# Patient Record
Sex: MALE | Race: WHITE | NOT HISPANIC OR LATINO | Employment: OTHER | URBAN - NONMETROPOLITAN AREA
[De-identification: names, ages, dates, MRNs, and addresses within clinical notes are randomized per-mention and may not be internally consistent; named-entity substitution may affect disease eponyms.]

---

## 2020-10-12 ENCOUNTER — OFFICE VISIT (OUTPATIENT)
Dept: ORTHOPEDIC SURGERY | Facility: CLINIC | Age: 75
End: 2020-10-12

## 2020-10-12 VITALS — HEIGHT: 71 IN | WEIGHT: 250 LBS | TEMPERATURE: 97.8 F | BODY MASS INDEX: 35 KG/M2

## 2020-10-12 DIAGNOSIS — G30.0 DEMENTIA IN ALZHEIMER'S DISEASE WITH EARLY ONSET (HCC): ICD-10-CM

## 2020-10-12 DIAGNOSIS — M17.12 PRIMARY OSTEOARTHRITIS OF LEFT KNEE: Primary | ICD-10-CM

## 2020-10-12 DIAGNOSIS — F02.80 DEMENTIA IN ALZHEIMER'S DISEASE WITH EARLY ONSET (HCC): ICD-10-CM

## 2020-10-12 PROCEDURE — 99204 OFFICE O/P NEW MOD 45 MIN: CPT | Performed by: ORTHOPAEDIC SURGERY

## 2020-10-12 RX ORDER — ZOLPIDEM TARTRATE 10 MG/1
TABLET ORAL
COMMUNITY
Start: 2020-09-08

## 2020-10-12 RX ORDER — POTASSIUM CHLORIDE 1500 MG/1
TABLET, EXTENDED RELEASE ORAL
COMMUNITY
Start: 2020-09-17

## 2020-10-12 RX ORDER — DONEPEZIL HYDROCHLORIDE 10 MG/1
TABLET, FILM COATED ORAL
COMMUNITY
Start: 2020-07-23

## 2020-10-12 RX ORDER — PSEUDOEPHEDRINE HCL 30 MG/1
TABLET, FILM COATED ORAL
COMMUNITY
Start: 2020-10-02

## 2020-10-12 RX ORDER — EZETIMIBE 10 MG/1
TABLET ORAL
COMMUNITY
Start: 2020-07-24

## 2020-10-12 RX ORDER — TRAMADOL HYDROCHLORIDE 50 MG/1
50 TABLET ORAL 4 TIMES DAILY
COMMUNITY
Start: 2020-09-16

## 2020-10-12 RX ORDER — OMEPRAZOLE 40 MG/1
CAPSULE, DELAYED RELEASE ORAL
COMMUNITY
Start: 2020-09-17

## 2020-10-12 RX ORDER — AMLODIPINE BESYLATE AND BENAZEPRIL HYDROCHLORIDE 10; 40 MG/1; MG/1
CAPSULE ORAL
COMMUNITY
Start: 2020-08-12

## 2020-10-12 RX ORDER — PREGABALIN 200 MG/1
CAPSULE ORAL
COMMUNITY
Start: 2020-07-24

## 2020-10-12 RX ORDER — ASPIRIN 81 MG/1
81 TABLET ORAL DAILY
COMMUNITY

## 2020-10-12 RX ORDER — ATENOLOL 25 MG/1
TABLET ORAL
COMMUNITY
Start: 2020-09-08

## 2020-10-12 NOTE — PROGRESS NOTES
"NEW VISIT    Patient: Saji Potts  ?  YOB: 1945    MRN: [id number]  ?  Chief Complaint   Patient presents with   • Left Knee - Pain   • Establish Care      ?  HPI: NEW LEFT KNEE PAIN  Saji Lowe presents to the office with bilateral knee pain and discomfort.  He has worsening knee pain on the left side as compared to the right side.  The patient states that he has pain and discomfort for several years.  Over the past few months his symptoms have gotten progressively worse.  He is unable to fully extend his knee because of the arthritis.  He has been treated with a brace and anti-inflammatory medication.  He states \"my knees are essentially worn out \".  He uses a walker for assistance with ambulation because he is afraid of his knee buckling and giving out from underneath him.  He has a progressive varus deformity which is worse on the left knee.      Pain Location: LEFT knee  Radiation: none  Quality: aching, sharp  Intensity/Severity: mild-moderate  Duration: 4-5 years  Onset quality: gradual   Timing: intermittent  Aggravating Factors: going up and down stairs, kneeling, rising after sitting, squatting  Alleviating Factors: NSAIDs, rest, ice  Previous Episodes: no  Associated Symptoms: pain, swelling  ADLs Affected: ambulating, recreational activities/sports  Previous Treatment: uses walker and cane    This patient is a new patient.  This problem is new to this examiner.      Allergies: No Known Allergies    Medications:   Home Medications:  Current Outpatient Medications on File Prior to Visit   Medication Sig   • amLODIPine-benazepril (LOTREL) 10-40 MG per capsule    • aspirin 81 MG EC tablet Take 81 mg by mouth Daily.   • atenolol (TENORMIN) 25 MG tablet    • donepezil (ARICEPT) 10 MG tablet TAKE 1 TABLET BY MOUTH AT NIGHT FOR 30 DAYS   • ezetimibe (ZETIA) 10 MG tablet    • KLOR-CON 20 MEQ CR tablet    • omeprazole (priLOSEC) 40 MG capsule    • pregabalin (LYRICA) 200 MG capsule    • " "SudoGest 30 MG tablet TAKE 2 TABLETS BY MOUTH EVERY 6 HOURS   • traMADol (ULTRAM) 50 MG tablet Take 50 mg by mouth 4 (Four) Times a Day.   • zolpidem (AMBIEN) 10 MG tablet      No current facility-administered medications on file prior to visit.      Current Medications:  Scheduled Meds:  PRN Meds:.    I have reviewed the patient's medical history in detail and updated the computerized patient record.  Review and summarization of old records include:    Past Medical History:   Diagnosis Date   • Diabetes mellitus (CMS/HCC)    • Kidney disease      No past surgical history on file.  Social History     Occupational History   • Not on file   Tobacco Use   • Smoking status: Never Smoker   • Smokeless tobacco: Never Used   Substance and Sexual Activity   • Alcohol use: Defer   • Drug use: Defer   • Sexual activity: Defer      No family history on file.      Review of Systems   Constitutional: Negative.  Negative for fever.   HENT: Negative.    Eyes: Negative.    Respiratory: Negative.    Cardiovascular: Negative.    Endocrine: Negative.    Genitourinary: Negative.    Musculoskeletal: Positive for arthralgias, gait problem and joint swelling.   Skin: Negative.  Negative for rash and wound.   Allergic/Immunologic: Negative.    Neurological: Negative for numbness.   Hematological: Negative.    Psychiatric/Behavioral: Negative.           Wt Readings from Last 3 Encounters:   10/12/20 113 kg (250 lb)     Ht Readings from Last 3 Encounters:   10/12/20 180.3 cm (71\")     Body mass index is 34.87 kg/m².  Facility age limit for growth percentiles is 20 years.  Vitals:    10/12/20 1523   Temp: 97.8 °F (36.6 °C)         Physical Exam  Constitutional: Patient is oriented to person, place, and time. Appears well-developed and well-nourished.   HENT:   Head: Normocephalic and atraumatic.   Eyes: Conjunctivae and EOM are normal. Pupils are equal, round, and reactive to light.   Cardiovascular: Normal rate, regular rhythm, normal heart " sounds and intact distal pulses.   Pulmonary/Chest: Effort normal and breath sounds normal.   Musculoskeletal:   See detailed exam below   Neurological: Alert and oriented to person, place, and time. No sensory deficit. Coordination normal.   Skin: Skin is warm and dry. Capillary refill takes less than 2 seconds. No rash noted. No erythema.   Psychiatric: Patient has a normal mood and affect. His behavior is normal. Judgment and thought content normal.   Nursing note and vitals reviewed.      Ortho Exam:   Left knee (varus). Patient has crepitus throughout range of motion. Positive patellar grind test. Mild effusion. Lachman is negative. Pivot shift is negative. Anterior and posterior drawer signs are negative. Significant joint line tenderness is noted on the medial aspect of the knee. Patient has a varus orientation of the knee. There is fullness and tenderness in the Popliteal fossa. Mild distention of a Popliteal cyst is noted in this location. Range of motion in flexion is from  degrees. Neurovascular status is intact.  Dorsalis pedis and posterior tibial artery pulses are palpable. Common peroneal nerve function is well preserved. Patient's gait is cautious and antalgic. Skin and soft tissues are mildly swollen, consistent with synovitis and effusion. The patient has a significant limp with the first few steps after starting the gait cycle. Getting out of a chair takes a lot of effort due to pain on knee flexion.       Diagnostics:  left knee xrays ap/lateralviews were performed at [facility]  on Last month. These images were independently viewed and interpreted by myself, my impression as follows:    bilateral Knee X-Ray  Indication: Pain and varus deformity of both knees.  AP, Lateral views  Findings: Advanced degenerative osteoarthritis with a varus deformity and bone-on-bone appearance of the medial compartment of the knee.  no bony lesion  Soft tissues within normal limits  decreased joint  spaces  Hardware appropriately positioned not applicable      no prior studies available for comparison.    This patient's x-ray report was graded according to the Kellgren and Kleber classification.  This took into account the joint space narrowing, osteophyte formation, sclerosis of the distal femur/proximal tibia along with deformity of those bones.  The findings were indicative of K L grade 3.    X-RAY was ordered and reviewed by Jean Henry MD  Assessment:  Saji was seen today for establish care and pain.    Diagnoses and all orders for this visit:    Primary osteoarthritis of left knee          Procedures  ?    Plan    · Compression/brace to the knee to prevent it from buckling and giving out.  · The patient has tried every form of conservative nonoperative management and now wants to proceed with total knee arthroplasty.  · Time spent in the office today with the patient is 45 minutes of which greater than 50% of my time spent face-to-face with the patient going over treatment options and potential complications of the surgical procedure.  · He has had a strong cardiac history and has had 8 stents placed and therefore I would recommend to have the surgery performed in a location where we would have cardiology services available in-house.  · Rest, ice, compression, and elevation (RICE) therapy  · Stretching and strengthening exercises  · Alternate Ibuprofen and Tylenol as needed  The patient was seen today for preoperative discussion.  The patient has been tried on over-the-counter and prescription NSAID's despite the risks of anti-inflammatory bleeding, peptic ulcers and erosive gastritis with short term benefit only.  Braces have been prescribed for mechanical support.  Patient has been participating in an exercise program specifically targeting joint pain relief with limited benefit. Intraarticular injections have been used periodically with some but not complete relief of pain.  Ambulation aids have  also been utilized.    · The details of the surgical procedure were explained including the location of probable incisions and a description of the likely hardware/grafts to be used. The patient understands the likely convalescence after surgery as well as the rehabilitation required.  Also, we have thoroughly discussed with the patient the risks, benefits and alternatives to surgery.  Risks include but are not limited to the risk of infection, joint stiffness, limited range of motion, wound healing problems, scar tissue build up, myocardial infarction, stroke, blood clots (including DVT and/or pulmonary embolus along with the risk of death) neurologic and/or vascular injury, limb length discrepancy, fracture, dislocation, nonunion, malunion, continued pain and need for further surgery including hardware failure requiring revision.     Date of encounter: 10/12/2020   Jean Henry MD

## 2020-10-13 RX ORDER — MELOXICAM 7.5 MG/1
15 TABLET ORAL ONCE
Status: CANCELLED | OUTPATIENT
Start: 2020-10-13 | End: 2020-10-13

## 2020-10-13 RX ORDER — ACETAMINOPHEN 325 MG/1
1000 TABLET ORAL ONCE
Status: CANCELLED | OUTPATIENT
Start: 2020-10-13 | End: 2020-10-13

## 2020-10-13 RX ORDER — PREGABALIN 150 MG/1
150 CAPSULE ORAL ONCE
Status: CANCELLED | OUTPATIENT
Start: 2020-10-13 | End: 2020-10-13

## 2020-10-20 ENCOUNTER — TELEPHONE (OUTPATIENT)
Dept: ORTHOPEDIC SURGERY | Facility: CLINIC | Age: 75
End: 2020-10-20

## 2020-10-20 DIAGNOSIS — M17.12 PRIMARY OSTEOARTHRITIS OF LEFT KNEE: Primary | ICD-10-CM

## 2020-10-20 NOTE — TELEPHONE ENCOUNTER
"PATIENT STATED THAT DR. TYLER MENTIONED TO HIM AT HIS OFFICE VISIT THAT HE WOULD NEED TO DO A LITTLE BIT OF THERAPY PRE-OPERATIVELY. WILL SOMEONE PLEASE PLACE \"PRE-HAB\" ORDERS FOR LEFT KNEE DEGENERATIVE JOINT DISEASE SCHEDULING FOR A LEFT TOTAL KNEE REPLACEMENT. ORDER CAN BE MAILED TO PATIENT AND HE WILL TAKE IT TO A FACILITY CLOSE TO HIS HOME. I DON'T MIND TO MAIL IT IF YOU LET ME KNOW ONCE THE ORDERS ARE IN. THANKS!!  "

## 2020-10-20 NOTE — TELEPHONE ENCOUNTER
AFTER LOOKING AT DR TYLER'S OFFICE NOTES AND RECOMMENDATION OF HAVING MR ERICKSON'S SURGERY AT [facility] GIVEN HIS CARDIAC HISTORY I HAVE CALLED THE PATIENT AND LET HIM KNOW THIS. I HAVE ALSO ASKED THAT HE CALL HIS CARDIOLOGIST TO LET THEM KNOW THAT HE IS SCHEDULING THIS SURGERY AND SEE IF AN APPOINTMENT IS NECESSARY TO OBTAIN A CARDIAC CLEARANCE. I HAVE SENT A LETTER TO HIS CARDIOLOGIST EXPLAINING TO THEM WHAT PROCEDURE WE ARE SCHEDULING AND WHERE HE WILL BE HAVING HIS SURGERY. THAT LETTER WAS FAXED TO [facility] CARDIOLOGIST IN [place] [phone]./AW

## 2020-11-16 ENCOUNTER — TELEPHONE (OUTPATIENT)
Dept: ORTHOPEDIC SURGERY | Facility: CLINIC | Age: 75
End: 2020-11-16

## 2020-11-16 NOTE — TELEPHONE ENCOUNTER
PATIENT CALLED AND DOES NOT WANT TO PROCEED WITH LEFT TOTAL KNEE REPLACEMENT AT THIS TIME.  HE WANTS TO WAIT LONGER TO SEE IF THE THREAT OF COVID DIES DOWN SOME.  HE WILL CALL OUR OFFICE BACK WHEN HE IS READY TO PROCEED WITH SURGERY

## 2020-11-17 NOTE — TELEPHONE ENCOUNTER
That is perfectly understandable on my part.  I would wait till the threat of Covid is less and we can proceed with this knee replacement on a more elective basis thank you